# Patient Record
Sex: FEMALE | Race: WHITE | NOT HISPANIC OR LATINO | Employment: FULL TIME | URBAN - METROPOLITAN AREA
[De-identification: names, ages, dates, MRNs, and addresses within clinical notes are randomized per-mention and may not be internally consistent; named-entity substitution may affect disease eponyms.]

---

## 2023-09-10 ENCOUNTER — APPOINTMENT (OUTPATIENT)
Dept: GENERAL RADIOLOGY | Facility: HOSPITAL | Age: 33
End: 2023-09-10
Payer: COMMERCIAL

## 2023-09-10 ENCOUNTER — HOSPITAL ENCOUNTER (EMERGENCY)
Facility: HOSPITAL | Age: 33
Discharge: HOME | End: 2023-09-10
Attending: EMERGENCY MEDICINE | Admitting: EMERGENCY MEDICINE
Payer: COMMERCIAL

## 2023-09-10 VITALS
DIASTOLIC BLOOD PRESSURE: 66 MMHG | HEART RATE: 83 BPM | HEIGHT: 69 IN | SYSTOLIC BLOOD PRESSURE: 108 MMHG | OXYGEN SATURATION: 100 % | TEMPERATURE: 98.4 F | BODY MASS INDEX: 21.48 KG/M2 | WEIGHT: 145 LBS | RESPIRATION RATE: 14 BRPM

## 2023-09-10 DIAGNOSIS — S82.141A CLOSED FRACTURE OF RIGHT TIBIAL PLATEAU, INITIAL ENCOUNTER: Primary | ICD-10-CM

## 2023-09-10 DIAGNOSIS — M25.461 KNEE EFFUSION, RIGHT: ICD-10-CM

## 2023-09-10 DIAGNOSIS — M25.561 ACUTE PAIN OF RIGHT KNEE: ICD-10-CM

## 2023-09-10 PROCEDURE — 99283 EMERGENCY DEPT VISIT LOW MDM: CPT

## 2023-09-10 PROCEDURE — 25010000002 DEXAMETHASONE SODIUM PHOSPHATE 10 MG/ML SOLUTION

## 2023-09-10 PROCEDURE — 63710000001 ONDANSETRON ODT 4 MG TABLET DISPERSIBLE

## 2023-09-10 PROCEDURE — 73560 X-RAY EXAM OF KNEE 1 OR 2: CPT

## 2023-09-10 PROCEDURE — 96372 THER/PROPH/DIAG INJ SC/IM: CPT

## 2023-09-10 RX ORDER — METHOCARBAMOL 750 MG/1
750 TABLET, FILM COATED ORAL ONCE
Status: COMPLETED | OUTPATIENT
Start: 2023-09-10 | End: 2023-09-10

## 2023-09-10 RX ORDER — DICLOFENAC SODIUM 75 MG/1
75 TABLET, DELAYED RELEASE ORAL 2 TIMES DAILY
Qty: 14 TABLET | Refills: 0 | Status: SHIPPED | OUTPATIENT
Start: 2023-09-10 | End: 2023-09-10 | Stop reason: SDUPTHER

## 2023-09-10 RX ORDER — OXYCODONE HYDROCHLORIDE AND ACETAMINOPHEN 5; 325 MG/1; MG/1
1 TABLET ORAL EVERY 6 HOURS PRN
Qty: 12 TABLET | Refills: 0 | Status: SHIPPED | OUTPATIENT
Start: 2023-09-10

## 2023-09-10 RX ORDER — DEXAMETHASONE SODIUM PHOSPHATE 10 MG/ML
10 INJECTION, SOLUTION INTRAMUSCULAR; INTRAVENOUS ONCE
Status: COMPLETED | OUTPATIENT
Start: 2023-09-10 | End: 2023-09-10

## 2023-09-10 RX ORDER — ONDANSETRON 4 MG/1
4 TABLET, ORALLY DISINTEGRATING ORAL ONCE
Status: COMPLETED | OUTPATIENT
Start: 2023-09-10 | End: 2023-09-10

## 2023-09-10 RX ORDER — METHOCARBAMOL 750 MG/1
750 TABLET, FILM COATED ORAL 3 TIMES DAILY PRN
Qty: 15 TABLET | Refills: 0 | Status: SHIPPED | OUTPATIENT
Start: 2023-09-10 | End: 2023-09-15

## 2023-09-10 RX ORDER — METHOCARBAMOL 750 MG/1
750 TABLET, FILM COATED ORAL 3 TIMES DAILY PRN
Qty: 15 TABLET | Refills: 0 | Status: SHIPPED | OUTPATIENT
Start: 2023-09-10 | End: 2023-09-10 | Stop reason: SDUPTHER

## 2023-09-10 RX ORDER — DICLOFENAC SODIUM 75 MG/1
75 TABLET, DELAYED RELEASE ORAL 2 TIMES DAILY
Qty: 14 TABLET | Refills: 0 | Status: SHIPPED | OUTPATIENT
Start: 2023-09-10 | End: 2023-09-17

## 2023-09-10 RX ORDER — HYDROCODONE BITARTRATE AND ACETAMINOPHEN 7.5; 325 MG/1; MG/1
1 TABLET ORAL ONCE
Status: COMPLETED | OUTPATIENT
Start: 2023-09-10 | End: 2023-09-10

## 2023-09-10 RX ORDER — OXYCODONE HYDROCHLORIDE AND ACETAMINOPHEN 5; 325 MG/1; MG/1
1 TABLET ORAL EVERY 6 HOURS PRN
Qty: 12 TABLET | Refills: 0 | Status: SHIPPED | OUTPATIENT
Start: 2023-09-10 | End: 2023-09-10

## 2023-09-10 RX ADMIN — METHOCARBAMOL 750 MG: 750 TABLET ORAL at 13:47

## 2023-09-10 RX ADMIN — ONDANSETRON 4 MG: 4 TABLET, ORALLY DISINTEGRATING ORAL at 13:47

## 2023-09-10 RX ADMIN — DEXAMETHASONE SODIUM PHOSPHATE 10 MG: 10 INJECTION, SOLUTION INTRAMUSCULAR; INTRAVENOUS at 13:48

## 2023-09-10 RX ADMIN — HYDROCODONE BITARTRATE AND ACETAMINOPHEN 1 TABLET: 7.5; 325 TABLET ORAL at 13:47

## 2023-09-10 NOTE — ED PROVIDER NOTES
"Subjective   History of Present Illness  Patient is a pleasant 32-year-old  female with no pertinent medical history who presents to the emergency room with complaints of acute onset right knee pain that started last night while she was dancing.  Patient states that she did not feel her knee pop but \"knew something was wrong\" when it happened.  She went to bed last night hoping that her pain would be improved this morning but woke up to increase swelling.  She tried to ice it at home with no improvement.  She takes Vyvanse and Prozac on a daily basis and reports allergies to Ventolin.  She denies use of drugs, alcohol tobacco.    Review of Systems   Constitutional:  Negative for appetite change and fever.   HENT:  Negative for congestion and rhinorrhea.    Respiratory:  Negative for shortness of breath.    Cardiovascular:  Negative for chest pain.   Gastrointestinal:  Negative for abdominal pain, nausea and vomiting.   Genitourinary:  Negative for dysuria.   Musculoskeletal:  Positive for arthralgias, joint swelling and myalgias.   Neurological:  Negative for syncope.   Psychiatric/Behavioral:  The patient is not nervous/anxious.    All other systems reviewed and are negative.    No past medical history on file.    Allergies   Allergen Reactions    Ventolin [Albuterol] Other (See Comments)     tachy       No past surgical history on file.    No family history on file.    Social History     Socioeconomic History    Marital status: Single           Objective   Physical Exam  Vitals and nursing note reviewed.   Constitutional:       General: She is awake. She is not in acute distress.     Appearance: Normal appearance. She is well-developed. She is not diaphoretic.   HENT:      Head: Normocephalic and atraumatic.      Right Ear: Tympanic membrane, ear canal and external ear normal.      Left Ear: Tympanic membrane, ear canal and external ear normal.   Eyes:      Extraocular Movements: Extraocular movements " "intact.      Pupils: Pupils are equal, round, and reactive to light.   Cardiovascular:      Rate and Rhythm: Normal rate and regular rhythm.      Pulses: Normal pulses.      Heart sounds: Normal heart sounds. No murmur heard.  Pulmonary:      Effort: Pulmonary effort is normal.      Breath sounds: Normal breath sounds.   Abdominal:      General: Bowel sounds are normal.      Palpations: Abdomen is soft.   Musculoskeletal:         General: Swelling and tenderness present. No deformity. Normal range of motion.      Cervical back: Normal range of motion and neck supple.   Skin:     General: Skin is warm and dry.      Capillary Refill: Capillary refill takes less than 2 seconds.   Neurological:      General: No focal deficit present.      Mental Status: She is alert and oriented to person, place, and time. Mental status is at baseline.      GCS: GCS eye subscore is 4. GCS verbal subscore is 5. GCS motor subscore is 6.      Cranial Nerves: Cranial nerves 2-12 are intact.      Sensory: Sensation is intact.      Motor: Motor function is intact.      Deep Tendon Reflexes: Reflexes are normal and symmetric.   Psychiatric:         Mood and Affect: Mood normal.         Behavior: Behavior normal. Behavior is cooperative.       Procedures           ED Course      /61   Pulse 78   Temp 98.3 °F (36.8 °C)   Resp 14   Ht 175.3 cm (69\")   Wt 65.8 kg (145 lb)   LMP 08/10/2023   SpO2 100%   BMI 21.41 kg/m² .edmeds  .edlabs    XR Knee 1 or 2 View Right    Result Date: 9/10/2023  Impression: Depressed lateral tibial plateau fracture with large suprapatellar effusion. Questionable posterior fibular head fracture. Electronically Signed: Tavo Lowry MD  9/10/2023 2:23 PM EDT  Workstation ID: [id number]                                        Medical Decision Making  Problems Addressed:  Acute pain of right knee: complicated acute illness or injury  Closed fracture of right tibial plateau, initial encounter: complicated acute " illness or injury  Knee effusion, right: complicated acute illness or injury    Risk  Prescription drug management.    Patient is a 32-year-old  female with no prior medical history presents emergency room with complaints of acute onset right knee pain that occurred while she was dancing last night.  On exam, there is a moderate amount of swelling to the anterior aspect of patient's right knee surrounding her patella.  No ecchymosis noted but patient is tender on medial and lateral joint lines.  Range of motion is significantly decreased and she is reluctant to moving her knee due to pain.  She can move her ankles appropriately and distal pulses are strong and equal bilaterally.  No decrease in sensation.  Cap refill less than 2 seconds.  Normal S1/S2 without clicks or murmurs.  No JVD.  Lungs clear on auscultation in all fields.  Pupils PERRLA. GCS 15.  Initial differentials include patellar fracture, knee dislocation, ligament tear, knee sprain.  This is not a complete list.    Patient received above examination.  Her symptoms were managed with Robaxin, Norco and Decadron.  My interpretation of x-ray reveals a fracture to the tibial plateau of patient's right knee.  This is concurrent with radiologist, who notes a depressed lateral tibial plateau fracture with large suprapatellar joint effusion.  There is also a questionable posterior fibular head fracture noted as well.  Upon reassessment, patient reports improvement in pain after medication.  Results were discussed with the patient and she was placed in knee immobilizer.  After being fit with crutches, patient was ambulated and was able to do so well.  Patient will be discharged with prescriptions for pain control and instructions to follow-up to orthopedist.  She verbalized understanding and is agreeable to plan of care.  She has remained hemodynamically stable and is in no acute distress.    I discussed the findings with patient who voices  understanding of discharge instructions, signs and symptoms requiring return to the ED; discharged improved and stable condition with follow-up for reevaluation.    I discussed with the patient the risks and benefits associated with opiate/narcotic pain medication today.  Based on patient's exam findings and assessment, I do feel it appropriate to prescribe a short course of opiate/narcotic pain medication from the emergency department.  The patient was advised of the risks associated with such medication, including risk of dependency.  Patient was advised of medication administration instructions, appropriate use, potential side effects and adverse reactions.  Acknowledged and verbalized understanding and agrees to treatment.    INSPECT report collected and reviewed on patient prior to prescibing controlled substance/medication. Patient condition considered appropriate for narcotic/controlled prescription.    Patient is aware that discharge does not mean that nothing is wrong but it indicates no emergency is present and they must continue care with follow-up as given below or physician of their choice.    This document is intended for medical expert use only.  Reading of this document by patients and/or patient's family without participating medical staff guidance may result in misinterpretation and unintended morbidity.  Any interpretation of such data is the responsibility of the patient and/or family member responsible for the patient in concert with their primary or specialist providers, not to be left for sources of online search as such as [employer], [employer] or similar queries.  Relying on these approaches to knowledge may result in misinterpretation, misguided goals of care and even death should patient or family members try recommendations outside of the realm of professional medical care in a supervised inpatient environment.    This medical document was created using Dragon dictation system. Some errors in  speech recognition may occur.    Final diagnoses:   Closed fracture of right tibial plateau, initial encounter   Acute pain of right knee   Knee effusion, right       ED Disposition  ED Disposition       ED Disposition   Discharge    Condition   Stable    Comment   --               Sumanth Alicia MD  [address] [place] [address] [phone]          PATIENT CONNECTION - [facility] [id number] [phone]             Medication List        New Prescriptions      diclofenac 75 MG EC tablet  Commonly known as: VOLTAREN  Take 1 tablet by mouth 2 (Two) Times a Day for 7 days.     methocarbamol 750 MG tablet  Commonly known as: ROBAXIN  Take 1 tablet by mouth 3 (Three) Times a Day As Needed for Muscle Spasms for up to 5 days.     oxyCODONE-acetaminophen 5-325 MG per tablet  Commonly known as: PERCOCET  Take 1 tablet by mouth Every 6 (Six) Hours As Needed for Moderate Pain for up to 3 days.               Where to Get Your Medications        These medications were sent to [id number] - [place], [place] - [address] [facility] - [phone]  - [phone]   [address] [place] [address]      Phone: [phone]   diclofenac 75 MG EC tablet  methocarbamol 750 MG tablet  oxyCODONE-acetaminophen 5-325 MG per tablet            Paula Beaver, APRN  09/10/23 1543

## 2023-09-10 NOTE — DISCHARGE INSTRUCTIONS
Wear knee immobilizer until cleared by Ortho.  Use crutches for ambulatory aid and avoid weightbearing on your right leg.  Take medications as directed.  Alternate use of ice and heat for 20 minutes at a time several times a day.  Keep leg elevated when not in use.    Follow-up with orthopedist for further evaluation and management of tibial fracture.  Follow-up with primary care provider as needed.    Return to the ER for new or worsening symptoms.